# Patient Record
(demographics unavailable — no encounter records)

---

## 2018-11-15 NOTE — ULT
ULTRASOUND ABDOMEN:

 

HISTORY: 

Splenomegaly.

 

FINDINGS: 

The liver has a heterogeneous echotexture with a small 7 mm cyst in the left lobe.  No intrahepatic d
uctal dilatation is seen.  The spleen is normal measuring 11.8 cm in length.  There are dilated vesse
ls of the hilum of the spleen.  No gallstones, gallbladder wall thickening, or pericholecystic fluid 
are seen.  The common duct measures 4 mm in diameter.  The pancreas is not satisfactorily visualized 
due to overlying bowel gas.  The visualized portions of the aorta and IVC are unremarkable.  The kidn
eys have a normal appearance.  No free fluid is seen.  

 

IMPRESSION: 

1.  A 7 mm left liver lobe cyst.

 

2.  No evidence of splenomegaly.  There are varicosities of the hilum of the spleen.

 

3.  No evidence of cholelithiasis.

 

POS: ALEXANDRO

## 2019-10-17 NOTE — CT
EXAM: CT chest without contrast per low-dose cancer screening protocol



HISTORY: History of smoking and nicotine dependence; smoker for 30 years. Shortness of breath.



COMPARISON: None



TECHNIQUE: Multiple contiguous axial images were obtained in a CT of the chest without contrast per l
ow-dose cancer screening protocol. Sagittal and coronal reformats were performed.



FINDINGS: 

Pulmonary nodules: No suspicious pulmonary nodules are seen. No focal infiltrates are seen. Chronic p
eripheral increased interstitial lung markings are seen.

Pleural space: No pneumothorax or pleural effusion are seen. 



Heart: The heart is normal in size. 

Mediastinum: No hilar or mediastinal lymphadenopathy appreciated on this limited noncontrast examinat
ion.



Bones: Degenerative changes are seen in the spine..



Visualized subdiaphragmatic structures: Unremarkable.



IMPRESSION:

Lung RADS category 1-negative.



Reported By: Ray Lopez 

Electronically Signed:  10/17/2019 10:21 AM

## 2020-01-06 NOTE — HP
PRIMARY CARE PHYSICIAN:  [facility].



REASON FOR ADMISSION:  Dyspnea.



HISTORY OF PRESENT ILLNESS:  A 73-year-old [place]ian male, who has underlying

history of benign enlargement of prostate, hypertension, dyslipidemia, who presented

to emergency room with increasing shortness of breath.  The patient has weight gain

and edema all over his body for the last several days.  Last night around 11 p.m.,

patient was having shortness of breath and he was not able to breathe and his

shortness of breath was getting worse with lying down position and that is why the

patient was brought to emergency room for evaluation.  The patient has increasing

bilateral lower extremity edema as well as orthopnea.  He was having cough.  He was

smoking up until few months ago and then he recently quit smoking.  He denies any

wheezing.  He denies any chest pain, palpitation, or syncope.  He denies any fever

or chills.  He denies any flu-like illness. 



In the emergency room, the patient had routine blood test done which showed

macrocytic anemia, thrombocytopenia, and his creatinine is elevated to 3.29.  His

troponin was indeterminate range and his BNP was elevated.  The patient was found

with anasarca in the emergency room.  He was given Lasix 40 mg IV push and aspirin

and subsequently he was admitted to telemetry floor. 



REVIEW OF SYSTEMS:  CONSTITUTIONAL:  Negative for weight loss or gain, ability to

conduct usual activities. 

SKIN:  Negative for rash, itching. 

EYES:  Negative for double vision, pain. 

ENT/MOUTH:  Negative for nose bleeding, neck stiffness, pain, tenderness. 

CARDIOVASCULAR:  Negative for palpitations, dyspnea on exertion, orthopnea. 

RESPIRATORY:  Negative for shortness of breath, wheezing, cough, hemoptysis, fever

or night sweats. 

GASTROINTESTINAL:  Negative for poor appetite, abdominal pain, heartburn, nausea,

vomiting, constipation, or diarrhea. 

GENITOURINARY:  Negative for urgency, frequency, dysuria, nocturia. 

MUSCULOSKELETAL:  Negative for pain, swelling. 

NEUROLOGIC/PSYCHIATRIC:  Negative for anxiety, depression. 

ALLERGY/IMMUNOLOGIC:  Negative for skin rash, bleeding tendency. 



Please see my HPI for pertinent positives and negatives.  All other review of

systems reviewed and negative except as mentioned in HPI. 



PAST MEDICAL HISTORY:  Carpal tunnel syndrome, benign enlargement of prostate,

hypertension, dyslipidemia. 



PAST SURGICAL HISTORY:  There is a report of heart valve replacement but when I ask

the patient and other family member, they are not aware of any kind of surgical

history regarding heart, so unable to verify this information. 



PAST PSYCHIATRIC HISTORY:  Reviewed and negative.



SOCIAL HISTORY:  The patient is having smoking history about 1 pack per day, but he

recently quit few months ago.  He denies any alcohol abuse.  He denies any other

illicit drug abuse. 



FAMILY HISTORY:  No strong family history of premature coronary artery disease,

stroke, or cancer. 



ALLERGIES:  NO KNOWN DRUG ALLERGY.



CURRENT HOME MEDICATION:  

1. Lipitor 10 mg p.o. daily.

2. Wellbutrin  mg daily.

3. Coreg 6.25 mg p.o. b.i.d.

4. Clonidine 0.1 mg p.o. at bedtime.

5. Drisdol 1.25 mg every seven days.  

6. Hydralazine 50 mg t.i.d.

7. Flomax 0.4 mg p.o. daily.



EMERGENCY ROOM COURSE:  The patient is given Lasix and aspirin.



ALLERGIES:  NO KNOWN DRUG ALLERGIES.



PHYSICAL EXAMINATION:

VITAL SIGNS:  On arrival to emergency room, blood pressure 142/61, pulse 65,

respiratory rate 17, temperature 97.9, saturation 99% on room air.  Weight 99.7 kg. 

GENERAL:  The patient is currently alert, awake, no obvious acute distress. 

HEENT:  Head normocephalic, atraumatic.  Eyes:  Pupils round, reactive to light.

Extraocular muscle intact.  Pale mucous membranes.  ENT:  Oropharynx within normal

limits.  Moist mucous membranes.  No oral lesion.  No pharyngeal erythema, no

exudate. 

NECK:  Supple, elevated JVD noted.  No thyromegaly.  No carotid bruits. 

LUNGS:  Bilateral scattered rales noted.  Few end-expiratory wheezing heard. 

CARDIAC:  S1, S2, regular, no murmur, no gallop, no rub. 

ABDOMEN:  Obesity present.  Bowel sounds present.  Nontender.  Nondistended.  No

organomegaly.  No mass. 

BACK:  Unremarkable. 

EXTREMITIES:  Upper extremity edema noted.  Good distal pulsation.  Lower

extremities:  Bilateral 4+ pitting edema noted, good distal pulsation. 

SKIN:  No skin rash. 

HEMATOLOGIC:  No lymphadenopathy. 

PSYCHIATRIC:  Normal affect. 

NEUROLOGIC:  Nonfocal examination.



SIGNIFICANT LABORATORY DATA:  Chest x-ray showing cardiomegaly and pulmonary edema.

EKG showing first-degree AV block, right bundle-branch block pattern. 



ASSESSMENT AND PLAN:  

1. Acute on chronic congestive heart failure exacerbation.  This patient does not

have any diagnosis of congestive heart failure, but he has ongoing problem which is

undiagnosed.  I am suspecting diastolic heart failure.  The patient will need

echocardiography.  The patient has significant volume overload and he has renal

insufficiency.  He has elevated troponin.  At this point, we are expecting that the

patient will stay in hospital longer than 2 days and that is why we will keep him as

inpatient status.  We will continue with Lasix 40 mg IV b.i.d. and fluid

restriction, dietary education, heart failure education given. 

2. Anasarca, likely related with renal insufficiency as well as underlying

cardiorenal syndrome.  The patient will need significant diuretic therapy and we

will monitor renal function as well as electrolytes during this hospital course. 

3. Acute on chronic kidney failure.  The patient's renal function has gotten worse

from previous and currently his GFR is 19%.  Nephrology has been consulted.  The

patient has underlying cardiorenal syndrome and that is why we will monitor his

renal function and electrolytes while in hospital. 

4. Macrocytic anemia.  We will consider folic acid and vitamin B12 therapy while in

hospital.  Ferrous sulfate 325 mg p.o. daily. 

5. Dyslipidemia.  Continue Lipitor 10 mg p.o. daily.

6. Elevated troponin, likely due to renal insufficiency and demand ischemia, but we

will continue with aspirin 81 mg p.o. daily, statin therapy, Lipitor 10 mg p.o.

daily, and we will consider Coreg 6.25 mg twice daily. 

7. Hypertension.  Continue hydralazine 50 mg t.i.d., Coreg 6.25 mg b.i.d., and

clonidine 0.1 mg at bedtime. 

8. Benign enlargement of prostate.  Continue Flomax 0.4 mg p.o. daily.

9. Tobacco abuse disorder.  Smoking cessation counseling given, healthy lifestyle

measure discussed with the patient. 

10. Deep venous thrombosis prophylaxis, no Lovenox because of low platelet count. GI

prophylaxis, Pepcid 20 mg p.o. daily.  Thrombocytopenia, unclear etiology, but we

will repeat CBC tomorrow. 

11. Obesity with BMI 34.  Dietary education given.  Weight loss education given.

12. Plan of care discussed with the patient and family member with family member

interpreted for me. 

13. Anxiety and depression.  We will continue bupropion  mg p.o. daily.

14. We are expecting the patient's stay in hospital more than 2 midnights.  Plan of

care discussed with the patient and family member in detail. 







Job ID:  [id number]

## 2020-01-06 NOTE — CON
DATE OF CONSULTATION:  01/06/2020



CONSULTING PHYSICIAN:  Dr. Vasquez.



REASON FOR CONSULTATION:  Acute kidney injury.



REASON FOR ADMISSION:  Shortness of breath.



HISTORY OF PRESENT ILLNESS:  This is a 73-year-old male with history of carpal

tunnel syndrome, BPH, hypertension, CKD, hyperlipidemia, came to the hospital with

shortness of breath and is having worsening renal function.  Also, Nephrology

consulted.  The patient is not feeling well.  Still having shortness of breath.  No

nausea or vomiting.  No chest pain or palpitation.  He was started on Lasix IV. 



PAST MEDICAL HISTORY:  Positive for;

1. Carpal tunnel syndrome.

2. BPH.

3. Hypertension.

4. Hyperlipidemia.

5. CKD.



PAST SURGICAL HISTORY:  Heart valve replacement.



HOME MEDICATIONS:  

1. Lipitor.

2. Wellbutrin.

3. Coreg.

4. Clonidine.

5. Drisdol.

6. Hydralazine.

7. Flomax.



ALLERGIES:  NO KNOWN DRUG ALLERGIES.



SOCIAL HISTORY:  Smokes one pack per day.  No alcohol or illicit drug abuse.



FAMILY HISTORY:  No history of kidney disease.



REVIEW OF SYSTEMS:  CONSTITUTIONAL:  Negative for weight loss or gain, ability to

conduct usual activities. 

SKIN:  Negative for rash, itching. 

EYES:  Negative for double vision, pain. 

ENT/MOUTH:  Negative for nose bleeding, neck stiffness, pain, tenderness. 

CARDIOVASCULAR:  Negative for palpitations, dyspnea on exertion, orthopnea. 

RESPIRATORY:  Negative for shortness of breath, wheezing, cough, hemoptysis, fever

or night sweats. 

GASTROINTESTINAL:  Negative for poor appetite, abdominal pain, heartburn, nausea,

vomiting, constipation, or diarrhea. 

GENITOURINARY:  Negative for urgency, frequency, dysuria, nocturia. 

MUSCULOSKELETAL:  Negative for pain, swelling. 

NEUROLOGIC/PSYCHIATRIC:  Negative for anxiety, depression. 

ALLERGY/IMMUNOLOGIC:  Negative for skin rash, bleeding tendency.



PHYSICAL EXAMINATION:

GENERAL:  This is a well-built male, in no apparent distress. 

VITAL SIGNS:  Temperature 97.9, pulse 69, respiratory rate 18, blood pressure

116/54. 

HEENT:  Atraumatic, normocephalic.  Oral mucosa is moist. 

NECK:  Supple. 

CV:  S1, S2 heard.  Rate and rhythm regular. 

RESPIRATORY:  Clear. 

GASTROINTESTINAL:  Abdomen is soft. 

MUSCULOSKELETAL:  1 to 2+ edema. 

DERMATOLOGIC:  No skin rash. 

NEUROLOGIC:  Alert, awake, and oriented x3.  No focal neurologic deficits.  Moving

all the extremities. 

PSYCHIATRIC:  Mood and affect normal.



LABORATORY DATA:  Hemoglobin 9.7 with MCV of 99.6.  Potassium 4.4, BUN is 55,

creatinine is 3.2, albumin is 3.2.  Urine studies with negative protein.  Renal

ultrasound is pending. 



ASSESSMENT AND PLAN:  

1. Acute kidney injury on chronic kidney disease stage 4.  Etiology is not clear.

No history of diabetes.  Elevated BNP.  Continue Lasix.  We will monitor.  We will

check renal ultrasound.  We will rule out myeloma. 

2. Acidosis.

3. Edema.

4. History of hypertension.

5. Hypoalbuminemia.

6. Anemia of chronic disease.

7. CKD bone mineral disease.  We will continue on vitamin D.

8. Vitamin D deficiency. 

We will check renal ultrasound and labs.  We will follow.  Continue Lasix. 



Thank you for the consult.







Job ID:  [id number]

## 2020-01-06 NOTE — RAD
SINGLE VIEW OF THE CHEST:

 

COMPARISON: 

None.

 

HISTORY: 

Shortness of breath lying flat.

 

FINDINGS: 

A single view of the chest shows an enlarged cardiomediastinal silhouette with atherosclerotic calcif
ications in the aorta.  There is no evidence of consolidation, mass, or pleural effusion.  Increased 
interstitial lung markings are present.

 

IMPRESSION: 

Cardiomegaly.

 

POS: CET

## 2020-01-07 NOTE — PRG
DATE OF SERVICE:  01/07/2020



SUBJECTIVE:  Patient was seen and examined at bedside and overnight events noted. 



Patient denies any shortness of breath or chest pain or palpitation. 



No history of nausea or vomiting or diarrhea or fever or chills or cramps.



OBJECTIVE:  GENERAL:  This is a well-built male, in no apparent distress. 

VITAL SIGNS:  Temperature 97.5.  Heart rate 57.  Respiratory rate 18.  Blood

pressure 97/52. 

HEENT:  Atraumatic, normocephalic.  Oral mucosa is moist 

NECK:  Supple. 

CARDIOVASCULAR:  S1, S2 heard.  Rate and rhythm regular. 

RESPIRATORY:  Clear to auscultation. 

GASTROINTESTINAL:  Abdomen is soft. 

MUSCULOSKELETAL:  No tenderness.  No edema. 

DERMATOLOGIC:  No skin rash. 

NEUROLOGIC:  Alert and awake and oriented X3.  No focal neurologic deficits. Moving

all the extremities. 

PSYCHIATRIC:  Mood and affect normal.



LABORATORY DATA:  Potassium is 4.4, BUN is 61, and creatinine is 3.6.



ASSESSMENT AND PLAN:  

1. Acute kidney injury on chronic kidney disease stage 4 with bump in creatinine.

We will reduce the Lasix to once a day.  The patient is feeling much better.  No

hypoxia. 

2. Acidosis, stable.

3. Edema __________.

4. Hypoalbuminemia.

5. Anemia of chronic disease.

6. Vitamin D deficiency. 



Renal ultrasound reviewed.  No obstruction.  Small bilateral __________ pleural

effusion present. 



No acute indication for dialysis.  I will reduce Lasix to once a day and we will

follow. 







Job ID:  [id number]

## 2020-01-07 NOTE — ULT
US Renal Bilateral STANDARD



History: Acute kidney injury. Evaluate for obstruction



Comparison: None.



Findings: Real-time grayscale and color evaluation of the kidneys and urinary bladder was performed.



Right kidney measures 8.7 x 4.3 x 4.5 cm and the left kidney measures 8.7 x 4.3 x 4.5 cm. No renal ma
ss, hydronephrosis, or abnormal calcifications.



Urinary bladder is unremarkable with prevoid volume of 70 mL.



There are dilated perisplenic vessels. Moderate bilateral pleural effusions.



Impression: 

1. No evidence for acute obstructive uropathy.

2. Small bilateral kidneys suggesting chronic medical renal disease.

3. Large splenic varices.

4. Moderate bilateral layering pleural effusions.



Reported By: Hunter Malik 

Electronically Signed:  1/7/2020 7:41 AM

## 2020-01-07 NOTE — PDOC.HOSPP
- Subjective


Encounter Date: 01/07/20


Encounter Time: 12:05


Subjective: 





f/u for CRISTOBAL/CKD IV receiving IV Lasix. Remains weak and fatigued. 





- Objective


Vital Signs & Weight: 


 Vital Signs (12 hours)











  Temp Pulse Resp BP BP Pulse Ox


 


 01/07/20 11:29  97.5 F L  57 L  20  97/52 L   96


 


 01/07/20 07:46   72  16   


 


 01/07/20 07:38  97.7 F  67  16   152/74 H  96


 


 01/07/20 03:55  97.5 F L  67  20  146/70 H   96


 


 01/07/20 00:38   62  16    96








 Weight











Weight                         236 lb 8 oz














I&O: 


 











 01/06/20 01/07/20 01/08/20





 06:59 06:59 06:59


 


Intake Total 0 1000 


 


Output Total 325 2100 


 


Balance -325 -1100 











Result Diagrams: 


 01/07/20 04:23





 01/07/20 04:23


Additional Labs: 





 Laboratory Tests











  08/15/18 01/06/20 01/06/20





  13:46 02:01 02:01


 


Hgb    9.7 L


 


Hct    28.3 L


 


MCV    99.6 H


 


Plt Count    83 L


 


Creatinine   3.29 H 


 


Uric Acid  9.5 H  


 


TSH 3rd Generation   














  01/06/20 01/06/20





  08:06 08:06


 


Hgb  


 


Hct  


 


MCV  


 


Plt Count  


 


Creatinine  


 


Uric Acid  8.9 H 


 


TSH 3rd Generation   2.4311











Radiology Reviewed by me: Yes (Echo - EF 55-60%, diast dysfxn, mod-severe LAE, 

mod TR)


EKG Reviewed by me: Yes (Tele - SR)





Hospitalist ROS





- Medication


Medications: 


Active Medications











Generic Name Dose Route Start Last Admin





  Trade Name Freq  PRN Reason Stop Dose Admin


 


Albuterol/Ipratropium  3 ml  01/06/20 13:00  01/07/20 07:46





  Duoneb  NEB   3 ml





  [id number] EBER   Administration





     





     





     





     


 


Aspirin  81 mg  01/06/20 09:00  01/07/20 09:18





  Aspirin Chewable  PO   81 mg





  DAILY EBER   Administration





     





     





     





     


 


Atorvastatin Calcium  10 mg  01/06/20 09:00  01/07/20 09:18





  Lipitor  PO   10 mg





  DAILY EBER   Administration





     





     





     





     


 


Bupropion HCl  150 mg  01/06/20 09:00  01/07/20 09:18





  Wellbutrin Sr  PO   150 mg





  DAILY EBER   Administration





     





     





     





     


 


Carvedilol  6.25 mg  01/07/20 09:00  01/07/20 09:18





  Coreg  PO   6.25 mg





  BID EBER   Administration





     





     





     





     


 


Clonidine  0.1 mg  01/06/20 21:00  01/06/20 20:00





  Catapres  PO   0.1 mg





  HS EBER   Administration





     





     





     





     


 


Ergocalciferol  1.25 mg  01/06/20 08:00  01/06/20 10:10





  Drisdol  PO   1.25 mg





  Q7D EBER   Administration





     





     





     





     


 


Famotidine  20 mg  01/06/20 09:00  01/07/20 09:19





  Pepcid  PO   20 mg





  DAILY EBER   Administration





     





     





     





     


 


Hydralazine HCl  50 mg  01/06/20 09:00  01/07/20 09:19





  Apresoline  PO   50 mg





  TID EBER   Administration





     





     





     





     


 


Tamsulosin HCl  0.4 mg  01/06/20 09:00  01/07/20 09:19





  Flomax  PO   0.4 mg





  DAILY EBER   Administration





     





     





     





     














- Exam


General Appearance: NAD, awake alert


Eye: PERRL, anicteric sclera


ENT: normocephalic atraumatic, no oropharyngeal lesions


Neck: supple, symmetric, no JVD, no thyromegaly


Heart: RRR, no murmur, no gallops, no rubs


Respiratory: rales


Respiratory - other findings: diminished in bilat bases 


Gastrointestinal: soft, non-tender, normal bowel sounds, no palpable masses


Extremities: 1+ LE edema


Extremities - other findings: + anasarca


Skin: normal turgor, no lesions


Neurological: cranial nerve grossly intact, no new deficit


Musculoskeletal: normal tone, normal strength


Psychiatric: normal affect, A&O x 3





Hosp A/P


(1) Acute diastolic (congestive) heart failure


Code(s): I50.31 - ACUTE DIASTOLIC (CONGESTIVE) HEART FAILURE   Status: Acute   


Plan: 


Continue Lasix 40mg IV daily, daily weights, I/O's








(2) Acute-on-chronic kidney injury


Code(s): N17.9 - ACUTE KIDNEY FAILURE, UNSPECIFIED; N18.9 - CHRONIC KIDNEY 

DISEASE, UNSPECIFIED   Status: Acute   


Plan: 


Multifactorial, avoid nephrotoxic meds and limit contrast, appreciate 

Nephrology assistance, serial creatinine








(3) Anasarca


Code(s): R60.1 - GENERALIZED EDEMA   Status: Acute   


Plan: 


Multifactorial, see above








(4) Anemia in CKD (chronic kidney disease)


Code(s): N18.9 - CHRONIC KIDNEY DISEASE, UNSPECIFIED; D63.1 - ANEMIA IN CHRONIC 

KIDNEY DISEASE   Status: Chronic   


Plan: 


No evidence of active loss, serial H/H monitoring








(5) HTN (hypertension)


Code(s): I10 - ESSENTIAL (PRIMARY) HYPERTENSION   Status: Chronic   


Qualifiers: 


   Hypertension type: essential hypertension   Qualified Code(s): I10 - 

Essential (primary) hypertension   


Plan: 


Monitor BP trend given increased use of Lasix








- Plan


respiratory therapy, out of bed/ambulate





Stable currently


Continue Lasix IV


Resume home BP meds


OOB/ambulate


Planning on returning to [facility] in 48h


AM lab: BMP

## 2020-01-08 NOTE — PRG
DATE OF SERVICE:  01/08/2020



SUBJECTIVE:  Patient was seen and examined at bedside and overnight events noted. 



Patient denies any shortness of breath or chest pain or palpitation. 



No history of nausea or vomiting or diarrhea or fever or chills or cramps.



OBJECTIVE:  GENERAL:  This is a well-built male, in no apparent distress. 

VITAL SIGNS:  Temperature 97.5.  Heart rate 65.  Respiratory rate 18.  Blood

pressure 133/60. 

HEENT:  Atraumatic, normocephalic.  Oral mucosa is moist 

NECK:  Supple. 

CARDIOVASCULAR:  S1, S2 heard.  Rate and rhythm regular. 

RESPIRATORY:  Clear to auscultation. 

GASTROINTESTINAL:  Abdomen is soft. 

MUSCULOSKELETAL:  No tenderness.  No edema. 

DERMATOLOGIC:  No skin rash. 

NEUROLOGIC:  Alert and awake and oriented X3.  No focal neurologic deficits. Moving

all the extremities. 

PSYCHIATRIC:  Mood and affect normal.



LABORATORY DATA:  Potassium 4.4, BUN is 64, and creatinine is 4.07.



ASSESSMENT AND PLAN:  

1. Acute kidney injury on chronic kidney stage 4, with worsening creatinine and

edema.  It is okay with discharging home with Lasix 40 p.o. b.i.d. and follow up

with the clinic with Dr. Sommer in 1 week. 

2. Acidosis, chronic.

3. Edema.

4. Anemia of chronic disease.

5. Vitamin D deficiency. 



Immunological workup is negative.  Urine protein to creatinine ratio nonnephrotic,

hypoalbuminemia present. 



Plan is to discharge on Lasix and recheck labs in 1 week and then follow up with the

clinic. 







Job ID:  [id number]

## 2020-01-09 NOTE — PQF
[employer] [facility] SWETA                
                            ASAF SPRINGER DO

[id number]                                                             [facility]

[id number]                             

                                   

CLINICAL DOCUMENTATION CLARIFICATION FORM:  POST DISCHARGE



Addendum to original discharge summary date:  __________________________________
____



Late entry note date:  _________________________________________________________
__











DATE:    1/9/20                                          ATTN:Arpan Davis





Please exercise your independent, professional judgment in responding to the 
clarification form. 

Clinical indicators are provided on the bottom of this form for your review



Can you please further clarify the diagnosis being related and evaluated?



Please check appropriate box(s):

[  ] elevated troponin due to demand ischemia only

[  ] elevated troponin due type 2 MI from demand ischemia

[ x ] elevated troponin due renal insufficiency

[  ] Other diagnosis ___________

[  ] Unable to determine

In addition, please specify:

Present on Admission (POA):  [ x ] Yes             [  ] No             [  ] 
Unable to determine



For continuity of documentation, please document condition throughout progress 
notes and discharge summary.  Thank You.



CLINICAL INDICATORS - SIGNS / SYMPTOMS/ LABS are present in the medical record:

H and P pg.4 -Elevated troponin likely due to renal insufficiency and demand 
ischemia

DS pg.1- Troponin range between 0.040 to 0.078

H and P pg.3- the patient does not have any diagnosis of CHF, but he is 
ongoing problem which is undiagnosed





RISK FACTORS

Acute on chronic diastolic CHF exacerbation- DS pg.1

Acute on chronic kidney disease- DS pg.1

Hypertension- DS pg,1

Obesity- H and P pg.4

Dyslipidemia- H and P pg.4



TREATMENT

IV Lasix- DS pg.1

Oxygen supplementation- DS pg.1

Echocardiogram 1/6

IV fluids-MAR

Aspirin 81 mg PO- MAR

















(This form is maintained as a part of the permanent medical record)

2015 [employer].  All Rights Reserved

[email or web address]    [not provided]

                                                              



MTDD